# Patient Record
Sex: FEMALE | Race: AMERICAN INDIAN OR ALASKA NATIVE
[De-identification: names, ages, dates, MRNs, and addresses within clinical notes are randomized per-mention and may not be internally consistent; named-entity substitution may affect disease eponyms.]

---

## 2018-01-13 ENCOUNTER — HOSPITAL ENCOUNTER (EMERGENCY)
Dept: HOSPITAL 14 - H.ER | Age: 53
Discharge: HOME | End: 2018-01-13
Payer: MEDICAID

## 2018-01-13 VITALS — OXYGEN SATURATION: 100 % | TEMPERATURE: 97.6 F

## 2018-01-13 VITALS — RESPIRATION RATE: 14 BRPM | HEART RATE: 78 BPM | DIASTOLIC BLOOD PRESSURE: 79 MMHG | SYSTOLIC BLOOD PRESSURE: 124 MMHG

## 2018-01-13 VITALS — BODY MASS INDEX: 25 KG/M2

## 2018-01-13 DIAGNOSIS — M54.30: Primary | ICD-10-CM

## 2018-01-13 DIAGNOSIS — I10: ICD-10-CM

## 2018-01-13 DIAGNOSIS — M54.12: ICD-10-CM

## 2018-01-13 PROCEDURE — 81025 URINE PREGNANCY TEST: CPT

## 2018-01-13 PROCEDURE — 96372 THER/PROPH/DIAG INJ SC/IM: CPT

## 2018-01-13 PROCEDURE — 99283 EMERGENCY DEPT VISIT LOW MDM: CPT

## 2018-01-13 NOTE — ED PDOC
HPI: Back


Time Seen by Provider: 01/13/18 09:36


Chief Complaint (Nursing): Back Pain


Chief Complaint (Provider): Back Pain


History Per: Patient


History/Exam Limitations: no limitations


Onset/Duration Of Symptoms: Days (x1 month)


Current Symptoms Are (Timing): Still Present


Additional Complaint(s): 


52 year old female with a past medical history of HTN and sciatica, who 

presents to the ED due to return of sciatic pain x1 month. States pain is in 

lower back and radiates to right buttock and right upper leg. States pain is 

worse with sitting or movements. Reports taking Aleve with minimal relief and 

taking an unknown muscle relaxant prescribed several weeks ago at her clinic 

which she discontinued due to somnolence. Denies weakness, numbness, or trouble 

with urination. Is ambulating effectively. Patient reports blood clot after 

pregnancy. Not currently on blood thinners. 





PMD: None provided








Past Medical History


Reviewed: Historical Data, Nursing Documentation, Vital Signs


Vital Signs: 


 Last Vital Signs











Temp  97.6 F   01/13/18 09:24


 


Pulse  81   01/13/18 09:24


 


Resp  16   01/13/18 09:24


 


BP  135/51 L  01/13/18 09:24


 


Pulse Ox  100   01/13/18 09:24














- Medical History


PMH: HTN


Other PMH: Sciatica





- Surgical History


Surgical History: No Surg Hx





- Family History


Family History: States: Unknown Family Hx





- Social History


Current smoker - smoking cessation education provided: No


Alcohol: None


Drugs: Denies





- Home Medications


Home Medications: 


 Ambulatory Orders











 Medication  Instructions  Recorded


 


Famotidine [Pepcid] 20 mg PO DAILY PRN #6 tab 09/12/14


 


Lidocaine 5% [Lidoderm] 1 patch TP DAILY PRN #10 patch 01/13/18


 


Naproxen [Naprosyn] 500 mg PO BID PRN #14 tablet 01/13/18


 


traMADol [Ultram] 50 mg PO TID PRN #12 tab 01/13/18














- Allergies


Allergies/Adverse Reactions: 


 Allergies











Allergy/AdvReac Type Severity Reaction Status Date / Time


 


amlodipine Allergy  DIZZINESS Verified 01/13/18 09:39














Review of Systems


ROS Statement: Except As Marked, All Systems Reviewed And Found Negative


Genitourinary Female: Negative for: Frequency, Incontinence


Musculoskeletal: Positive for: Back Pain, Leg Pain (right upper leg)


Neurological: Negative for: Weakness, Numbness





Physical Exam





- Reviewed


Nursing Documentation Reviewed: Yes


Vital Signs Reviewed: Yes





- Physical Exam


Appears: Positive for: Non-toxic, No Acute Distress


Head Exam: Positive for: ATRAUMATIC, NORMAL INSPECTION, NORMOCEPHALIC


Skin: Positive for: Normal Color, Warm


Eye Exam: Positive for: EOMI, Normal appearance, PERRL


Neck: Positive for: Normal, Painless ROM.  Negative for: Supple


Cardiovascular/Chest: Positive for: Regular Rate, Rhythm.  Negative for: Murmur


Respiratory: Positive for: Normal Breath Sounds.  Negative for: Respiratory 

Distress


Gastrointestinal/Abdominal: Positive for: Normal Exam, Bowel Sounds, Soft.  

Negative for: Tenderness


Back: Positive for: Other (tenderness to right lower lumbar region into right 

buttock, skin not examined, patient states no erythema or induration to skin)


Extremity: Positive for: Normal ROM.  Negative for: Pedal Edema, Deformity


Neurologic/Psych: Positive for: Alert, Oriented (x3).  Negative for: Motor/

Sensory Deficits





- ECG


O2 Sat by Pulse Oximetry: 100 (RA)


Pulse Ox Interpretation: Normal





Medical Decision Making


Medical Decision Making: 


Time: 09:40


Initial Impression: Sciatica 


Plan:


--ED urine pregnancy


--ED urine dipstick


--Lidoderm patch


--Toradol 30 mg IM


--Ultram 50 mg PO


--Reevaluation





Time: 10:45


--Upon provider reevaluation patient is feeling better, reports pain is much 

improved, and ambulating effectively. Patient will be discharged with Rx for 

Ultram, Naproxen, and Lidoderm patches. Recommended followup, as well as daily 

stretching and heat therapy. Return if symptoms persist or worsen. 








--------------------------------------------------------------------------------

-----------------


Scribe Attestation:


Documented by Daryn Nieto, acting as a scribe for Rosendo Payan III, DO.





Provider Scribe Attestation:


All medical record entries made by the Scribe were at my direction and 

personally dictated by me. I have reviewed the chart and agree that the record 

accurately reflects my personal performance of the history, physical exam, 

medical decision making, and the department course for this patient. I have 

also personally directed, reviewed, and agree with the discharge instructions 

and disposition.








Disposition





- Clinical Impression


Clinical Impression: 


 Sciatic pain








- Patient ED Disposition


Is Patient to be Admitted: No


Counseled Patient/Family Regarding: Diagnosis, Need For Followup, Rx Given





- Disposition


[facility] 


[facility]


Disposition: Routine/Home


Disposition Time: 10:45


Additional Instructions: 


Return to ER for any worse pain, weakness, numbness, difficulty urinating or 

any concern.


Use medications as directed.


Recommend gentle stretching and heat therapy to area 3x daily.





Prescriptions: 


Lidocaine 5% [Lidoderm] 1 patch TP DAILY PRN #10 patch


 PRN Reason: Other


Naproxen [Naprosyn] 500 mg PO BID PRN #14 tablet


 PRN Reason: Pain, Moderate (4-7)


traMADol [Ultram] 50 mg PO TID PRN #12 tab


 PRN Reason: Pain, Moderate (4-7)


Instructions:  Sciatica (ED), Lumbar Radiculopathy (ED), Back Exercises (ED)


Forms:  [facility] (English)